# Patient Record
Sex: MALE | Race: WHITE | NOT HISPANIC OR LATINO | Employment: STUDENT | URBAN - METROPOLITAN AREA
[De-identification: names, ages, dates, MRNs, and addresses within clinical notes are randomized per-mention and may not be internally consistent; named-entity substitution may affect disease eponyms.]

---
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
## 2022-01-14 ENCOUNTER — OFFICE VISIT (OUTPATIENT)
Dept: FAMILY MEDICINE CLINIC | Facility: CLINIC | Age: 22
End: 2022-01-14
Payer: COMMERCIAL

## 2022-01-14 VITALS
DIASTOLIC BLOOD PRESSURE: 74 MMHG | RESPIRATION RATE: 12 BRPM | TEMPERATURE: 98.7 F | HEART RATE: 84 BPM | HEIGHT: 68 IN | OXYGEN SATURATION: 98 % | WEIGHT: 161 LBS | SYSTOLIC BLOOD PRESSURE: 128 MMHG | BODY MASS INDEX: 24.4 KG/M2

## 2022-01-14 DIAGNOSIS — E10.9 TYPE 1 DIABETES MELLITUS WITHOUT COMPLICATION (HCC): Primary | ICD-10-CM

## 2022-01-14 DIAGNOSIS — Z78.9 MOTIVATIONAL BARRIER TO EDUCATION: ICD-10-CM

## 2022-01-14 PROBLEM — R07.89 SENSATION OF CHEST PRESSURE: Status: RESOLVED | Noted: 2021-07-13 | Resolved: 2022-01-14

## 2022-01-14 PROCEDURE — 3074F SYST BP LT 130 MM HG: CPT | Performed by: NURSE PRACTITIONER

## 2022-01-14 PROCEDURE — 3078F DIAST BP <80 MM HG: CPT | Performed by: NURSE PRACTITIONER

## 2022-01-14 PROCEDURE — 3008F BODY MASS INDEX DOCD: CPT | Performed by: NURSE PRACTITIONER

## 2022-01-14 PROCEDURE — 1036F TOBACCO NON-USER: CPT | Performed by: NURSE PRACTITIONER

## 2022-01-14 PROCEDURE — 99214 OFFICE O/P EST MOD 30 MIN: CPT | Performed by: NURSE PRACTITIONER

## 2022-01-14 NOTE — ASSESSMENT & PLAN NOTE
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

## 2022-01-14 NOTE — PROGRESS NOTES
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  Denies any significant issues with concentration  Return in about 3 months (around 4/14/2022) for Diabetic Follow Up  Subjective:      Patient ID: Melinda Knox is a 25 y o  male  Chief Complaint   Patient presents with    Diabetes     Pt here for a DM f/u  Jennifer Nava is a 25year old male who presents to the office for a diabetic follow up and discussion of school issues  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  No longer doing art  Denies any new acute complaints today  Reports he is just learning the coding and will see how he does as far as concentration  Denies fever, chills, chest pain, SOB  The following portions of the patient's history were reviewed and updated as appropriate: allergies, current medications, past family history, past medical history, past social history, past surgical history and problem list     Review of Systems   Constitutional: Negative for chills, fatigue and fever  Respiratory: Positive for chest tightness  Negative for cough and shortness of breath  Cardiovascular: Negative for chest pain  Psychiatric/Behavioral: Negative for decreased concentration  Current Outpatient Medications   Medication Sig Dispense Refill    Ascorbic Acid (VITAMIN C PO) Take by mouth daily      BD PEN NEEDLE JULES U/F 32G X 4 MM MISC USE AS DIRECTED TO INJECT INSULIN 4-6 TIMES PER DAY    6    Continuous Blood Gluc  (Dexcom G6 ) ZEV Use      Continuous Blood Gluc Sensor (Dexcom G6 Sensor) MISC Use      Continuous Blood Gluc Transmit (Dexcom G6 Transmitter) MISC Use  GLUCAGON EMERGENCY 1 MG injection       HUMALOG KWIKPEN 100 units/mL injection pen       Lancet Devices (ONE TOUCH DELICA LANCING DEV) MISC USE FOR BLOOD SUGAR CHECKS UP TO 8 TIMES PER DAY  1    LANTUS SOLOSTAR 100 units/mL injection pen       ONETOUCH DELICA LANCETS 13B MISC       ONETOUCH VERIO test strip       Zinc Gluconate ([employer] ZINC PO) Take by mouth daily       No current facility-administered medications for this visit  Objective:    /74 (BP Location: Right arm, Patient Position: Sitting, Cuff Size: Adult)   Pulse 84   Temp 98 7 °F (37 1 °C) (Temporal)   Resp 12   Ht 5' 8" (1 727 m)   Wt 73 kg (161 lb)   SpO2 98%   BMI 24 48 kg/m²        Physical Exam  Constitutional:       General: He is not in acute distress  Appearance: He is well-developed  He is not diaphoretic  HENT:      Head: Normocephalic and atraumatic  Eyes:      General:         Right eye: No discharge  Left eye: No discharge  Conjunctiva/sclera: Conjunctivae normal    Neck:      Thyroid: No thyromegaly  Cardiovascular:      Rate and Rhythm: Normal rate and regular rhythm  Heart sounds: Normal heart sounds  Pulmonary:      Effort: Pulmonary effort is normal  No respiratory distress  Breath sounds: Normal breath sounds  No decreased breath sounds, wheezing, rhonchi or rales  Skin:     General: Skin is warm and dry  Findings: No rash  Neurological:      Mental Status: He is alert and oriented to person, place, and time  Psychiatric:         Behavior: Behavior normal          Thought Content:  Thought content normal          Judgment: Judgment normal                 Isatu Specking, CRNP

## 2022-01-15 LAB
ALBUMIN/CREAT UR: 4 MG/G CREAT (ref 0–29)
CREAT UR-MCNC: 239.3 MG/DL
MICROALBUMIN UR-MCNC: 10.7 UG/ML

## 2022-01-15 PROCEDURE — 3061F NEG MICROALBUMINURIA REV: CPT | Performed by: NURSE PRACTITIONER

## 2022-09-06 ENCOUNTER — VBI (OUTPATIENT)
Dept: ADMINISTRATIVE | Facility: OTHER | Age: 22
End: 2022-09-06

## 2022-11-01 ENCOUNTER — OFFICE VISIT (OUTPATIENT)
Dept: FAMILY MEDICINE CLINIC | Facility: CLINIC | Age: 22
End: 2022-11-01

## 2022-11-01 VITALS
OXYGEN SATURATION: 99 % | BODY MASS INDEX: 25.01 KG/M2 | WEIGHT: 165 LBS | HEART RATE: 74 BPM | HEIGHT: 68 IN | TEMPERATURE: 98.2 F | SYSTOLIC BLOOD PRESSURE: 112 MMHG | RESPIRATION RATE: 18 BRPM | DIASTOLIC BLOOD PRESSURE: 64 MMHG

## 2022-11-01 DIAGNOSIS — M94.0 COSTOCHONDRITIS: Primary | ICD-10-CM

## 2022-11-01 DIAGNOSIS — R00.2 PALPITATIONS: ICD-10-CM

## 2022-11-01 DIAGNOSIS — Z23 NEED FOR INFLUENZA VACCINATION: ICD-10-CM

## 2022-11-01 DIAGNOSIS — E10.9 TYPE 1 DIABETES MELLITUS WITHOUT COMPLICATION (HCC): ICD-10-CM

## 2022-11-01 DIAGNOSIS — R56.9 CONVULSIONS, UNSPECIFIED CONVULSION TYPE (HCC): ICD-10-CM

## 2022-11-01 DIAGNOSIS — R06.02 SHORTNESS OF BREATH: ICD-10-CM

## 2022-11-01 DIAGNOSIS — R07.9 CHEST PAIN, UNSPECIFIED TYPE: ICD-10-CM

## 2022-11-01 PROBLEM — U07.1 COVID-19: Status: RESOLVED | Noted: 2021-09-27 | Resolved: 2022-11-01

## 2022-11-01 PROBLEM — Z20.822 CLOSE EXPOSURE TO COVID-19 VIRUS: Status: RESOLVED | Noted: 2020-06-26 | Resolved: 2022-11-01

## 2022-11-01 RX ORDER — NAPROXEN 500 MG/1
500 TABLET ORAL 2 TIMES DAILY WITH MEALS
Qty: 10 TABLET | Refills: 1 | Status: SHIPPED | OUTPATIENT
Start: 2022-11-01 | End: 2022-11-06

## 2022-11-01 RX ORDER — URINE ACETONE TEST STRIPS
STRIP MISCELLANEOUS
COMMUNITY
Start: 2022-08-23

## 2022-11-01 NOTE — PROGRESS NOTES
Assessment/Plan:    1  Costochondritis  -     naproxen (Naprosyn) 500 mg tablet; Take 1 tablet (500 mg total) by mouth 2 (two) times a day with meals for 5 days    2  Chest pain, unspecified type  -     POCT ECG  -     CBC and differential  -     Comprehensive metabolic panel  -     Sedimentation rate, automated  -     C-reactive protein    3  Palpitations  -     POCT ECG  -     CBC and differential  -     Comprehensive metabolic panel  -     Sedimentation rate, automated  -     C-reactive protein    4  Shortness of breath  -     POCT ECG  -     CBC and differential  -     Comprehensive metabolic panel  -     Sedimentation rate, automated  -     C-reactive protein    5  Type 1 diabetes mellitus without complication (HCC)  -     Hemoglobin A1C    6  Convulsions, unspecified convulsion type [facility])  Assessment & Plan:  No issues  No seizure activity       7  Need for influenza vaccination  -     influenza vaccine, quadrivalent, 0 5 mL, preservative-free, for adult and pediatric patients 6 mos+ (AFLURIA, FLUARIX, FLULAVAL, FLUZONE)      Symptoms of palpitations and shortness of breath are worse while at work  Possible anxiety causing symptoms  Pt reports he is scheduled to see cardiology  Recheck in one week here in our office  Return in about 1 week (around 11/8/2022) for Recheck  Subjective:      Patient ID: Joann Bose is a 23 y o  male  Chief Complaint   Patient presents with   • Chest Pain   • Palpitations   • Shortness of Breath       Eb Rider is a 23year old male diabetic who presents to the office for evaluation and management of chest pain  Reports the pain began about 2-3 weeks ago  States the pain is random in occurrence and is on his left side of his chest and it extends into his left side  Reports palpitations and shortness of breath that seem to become worse when he is at work  Pt works at a [employer] and 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   States that he has also been lifting at the gym        The following portions of the patient's history were reviewed and updated as appropriate: allergies, current medications, past family history, past medical history, past social history, past surgical history and problem list     Review of Systems   Constitutional: Negative for chills, diaphoresis, fatigue and fever  HENT: Negative for congestion, ear discharge, ear pain, postnasal drip, rhinorrhea, sinus pressure, sinus pain and sore throat  Eyes: Negative for pain and discharge  Respiratory: Positive for shortness of breath  Negative for cough, chest tightness and wheezing  Cardiovascular: Positive for chest pain and palpitations  Negative for leg swelling  Gastrointestinal: Negative for diarrhea, nausea and vomiting  Musculoskeletal: Negative for myalgias  Neurological: Negative for dizziness and headaches  Hematological: Negative for adenopathy  Current Outpatient Medications   Medication Sig Dispense Refill   • Ascorbic Acid (VITAMIN C PO) Take by mouth daily     • BD PEN NEEDLE JULES U/F 32G X 4 MM MISC USE AS DIRECTED TO INJECT INSULIN 4-6 TIMES PER DAY  6   • Continuous Blood Gluc  (Dexcom G6 ) ZEV Use     • Continuous Blood Gluc Sensor (Dexcom G6 Sensor) MISC Use     • Continuous Blood Gluc Transmit (Dexcom G6 Transmitter) MISC Use     • GLUCAGON EMERGENCY 1 MG injection      • HUMALOG KWIKPEN 100 units/mL injection pen      • Ketostix strip USE AS DIRECTED UP TO 3 TIMES PER DAY IF BLOOD GLUCOSE >300 OR SICK     • Lancet Devices (ONE TOUCH DELICA LANCING DEV) MISC USE FOR BLOOD SUGAR CHECKS UP TO 8 TIMES PER DAY    1   • LANTUS SOLOSTAR 100 units/mL injection pen      • naproxen (Naprosyn) 500 mg tablet Take 1 tablet (500 mg total) by mouth 2 (two) times a day with meals for 5 days 10 tablet 1   • ONETOUCH DELICA LANCETS 85C MISC      • ONETOUCH VERIO test strip      • Zinc Gluconate (CVS ZINC PO) Take by mouth daily       No current facility-administered medications for this visit  Objective:    /64   Pulse 74   Temp 98 2 °F (36 8 °C) (Temporal)   Resp 18   Ht 5' 8" (1 727 m)   Wt 74 8 kg (165 lb)   SpO2 99%   BMI 25 09 kg/m²        Physical Exam  Vitals reviewed  Constitutional:       General: He is not in acute distress  Appearance: He is well-developed  He is not diaphoretic  HENT:      Head: Normocephalic and atraumatic  Eyes:      General:         Right eye: No discharge  Left eye: No discharge  Conjunctiva/sclera: Conjunctivae normal    Neck:      Thyroid: No thyromegaly  Cardiovascular:      Rate and Rhythm: Normal rate  Rhythm irregular  Pulses: Normal pulses  no weak pulses          Dorsalis pedis pulses are 2+ on the right side and 2+ on the left side  Posterior tibial pulses are 2+ on the right side and 2+ on the left side  Heart sounds: Normal heart sounds  Pulmonary:      Effort: Pulmonary effort is normal  No respiratory distress  Breath sounds: Normal breath sounds  No decreased breath sounds, wheezing, rhonchi or rales  Chest:      Chest wall: Tenderness present  No mass  Musculoskeletal:      Cervical back: Normal range of motion and neck supple  Feet:      Right foot:      Skin integrity: No ulcer, skin breakdown, erythema, warmth, callus or dry skin  Left foot:      Skin integrity: No ulcer, skin breakdown, erythema, warmth, callus or dry skin  Lymphadenopathy:      Cervical: No cervical adenopathy  Skin:     General: Skin is warm and dry  Findings: No rash  Neurological:      Mental Status: He is alert and oriented to person, place, and time  Psychiatric:         Behavior: Behavior normal          Thought Content: Thought content normal          Judgment: Judgment normal            Patient's shoes and socks removed  Right Foot/Ankle   Right Foot Inspection  Skin Exam: skin normal and skin intact   No dry skin, no warmth, no callus, no erythema, no maceration, no abnormal color, no pre-ulcer, no ulcer and no callus  Toe Exam: ROM and strength within normal limits  Sensory   Vibration: intact  Proprioception: intact  Monofilament testing: intact    Vascular  Capillary refills: < 3 seconds  The right DP pulse is 2+  The right PT pulse is 2+  Left Foot/Ankle  Left Foot Inspection  Skin Exam: skin normal and skin intact  No dry skin, no warmth, no erythema, no maceration, normal color, no pre-ulcer, no ulcer and no callus  Toe Exam: ROM and strength within normal limits  Sensory   Vibration: intact  Proprioception: intact  Monofilament testing: intact    Vascular  Capillary refills: < 3 seconds  The left DP pulse is 2+  The left PT pulse is 2+       Assign Risk Category  No deformity present  No loss of protective sensation  No weak pulses  Risk: 0           Rosetta Rodriguez

## 2022-11-01 NOTE — LETTER
November 1, 2022     Patient: Jax Vizcarra  YOB: 2000  Date of Visit: 11/1/2022      To Whom it May Concern:    Jax Vizcarra is under my professional care  Rogelio Cuevas was seen in my office on 11/1/2022  Rogelio Cuevas may return to work on 11/7/52  If you have any questions or concerns, please don't hesitate to call           SincerelyGayle

## 2022-11-02 LAB
ALBUMIN SERPL-MCNC: 4.8 G/DL (ref 4.1–5.2)
ALBUMIN/GLOB SERPL: 2.4 {RATIO} (ref 1.2–2.2)
ALP SERPL-CCNC: 83 IU/L (ref 51–125)
ALT SERPL-CCNC: 14 IU/L (ref 0–44)
AST SERPL-CCNC: 20 IU/L (ref 0–40)
BASOPHILS # BLD AUTO: 0 X10E3/UL (ref 0–0.2)
BASOPHILS NFR BLD AUTO: 0 %
BILIRUB SERPL-MCNC: 0.3 MG/DL (ref 0–1.2)
BUN SERPL-MCNC: 10 MG/DL (ref 6–20)
BUN/CREAT SERPL: 16 (ref 9–20)
CALCIUM SERPL-MCNC: 9.4 MG/DL (ref 8.7–10.2)
CHLORIDE SERPL-SCNC: 101 MMOL/L (ref 96–106)
CO2 SERPL-SCNC: 24 MMOL/L (ref 20–29)
CREAT SERPL-MCNC: 0.62 MG/DL (ref 0.76–1.27)
CRP SERPL-MCNC: <1 MG/L (ref 0–10)
EGFR: 140 ML/MIN/1.73
EOSINOPHIL # BLD AUTO: 0.1 X10E3/UL (ref 0–0.4)
EOSINOPHIL NFR BLD AUTO: 1 %
ERYTHROCYTE [DISTWIDTH] IN BLOOD BY AUTOMATED COUNT: 11.9 % (ref 11.6–15.4)
ERYTHROCYTE [SEDIMENTATION RATE] IN BLOOD BY WESTERGREN METHOD: 4 MM/HR (ref 0–15)
EST. AVERAGE GLUCOSE BLD GHB EST-MCNC: 128 MG/DL
GLOBULIN SER-MCNC: 2 G/DL (ref 1.5–4.5)
GLUCOSE SERPL-MCNC: 96 MG/DL (ref 70–99)
HBA1C MFR BLD: 6.1 % (ref 4.8–5.6)
HCT VFR BLD AUTO: 42.4 % (ref 37.5–51)
HGB BLD-MCNC: 14.8 G/DL (ref 13–17.7)
IMM GRANULOCYTES # BLD: 0 X10E3/UL (ref 0–0.1)
IMM GRANULOCYTES NFR BLD: 0 %
LYMPHOCYTES # BLD AUTO: 1.8 X10E3/UL (ref 0.7–3.1)
LYMPHOCYTES NFR BLD AUTO: 22 %
MCH RBC QN AUTO: 31.7 PG (ref 26.6–33)
MCHC RBC AUTO-ENTMCNC: 34.9 G/DL (ref 31.5–35.7)
MCV RBC AUTO: 91 FL (ref 79–97)
MONOCYTES # BLD AUTO: 0.7 X10E3/UL (ref 0.1–0.9)
MONOCYTES NFR BLD AUTO: 8 %
NEUTROPHILS # BLD AUTO: 5.6 X10E3/UL (ref 1.4–7)
NEUTROPHILS NFR BLD AUTO: 69 %
PLATELET # BLD AUTO: 306 X10E3/UL (ref 150–450)
POTASSIUM SERPL-SCNC: 4.4 MMOL/L (ref 3.5–5.2)
PROT SERPL-MCNC: 6.8 G/DL (ref 6–8.5)
RBC # BLD AUTO: 4.67 X10E6/UL (ref 4.14–5.8)
SODIUM SERPL-SCNC: 140 MMOL/L (ref 134–144)
WBC # BLD AUTO: 8.3 X10E3/UL (ref 3.4–10.8)

## 2022-11-17 ENCOUNTER — OFFICE VISIT (OUTPATIENT)
Dept: CARDIOLOGY CLINIC | Facility: CLINIC | Age: 22
End: 2022-11-17

## 2022-11-17 VITALS
BODY MASS INDEX: 26.07 KG/M2 | SYSTOLIC BLOOD PRESSURE: 126 MMHG | HEART RATE: 71 BPM | TEMPERATURE: 97.8 F | DIASTOLIC BLOOD PRESSURE: 78 MMHG | WEIGHT: 172 LBS | HEIGHT: 68 IN | OXYGEN SATURATION: 99 %

## 2022-11-17 DIAGNOSIS — R07.2 PRECORDIAL PAIN: ICD-10-CM

## 2022-11-17 DIAGNOSIS — R06.02 SHORTNESS OF BREATH ON EXERTION: ICD-10-CM

## 2022-11-17 DIAGNOSIS — R00.2 PALPITATIONS: Primary | ICD-10-CM

## 2022-11-17 NOTE — PROGRESS NOTES
Consultation - Cardiology Office  [facility]  Melody Fletcher 23 y o  male MRN: [id number]  : 2000  Unit/Bed#:  Encounter: [id number]      ASSESSMENT:  Palpitations/Irregular heartbeats    Diabetes mellitus type 1    Chest pain and dyspnea on exertion    Costochondritis, on Naprosyn      RECOMMENDATIONS:  48 hour Holter monitor  Echocardiogram  Exercise stress test        Thank you for your consultation  If you have any question please call me at [phone]      Primary Care Physician Requesting Consult: Pierce Lema      Reason for Consult / Principal Problem:  Palpitations, shortness of breath chest pain        HPI :     Melody Fletcher is a 23y o  year old male who was referred by primary care doctor for cardiac evaluation of multiple symptoms including  Palpitations that the patient has been feeling once every few hours on a daily basis  He also complains of shortness of breath/dyspnea on exertion  He was diagnosed as costochondritis but is still concerned about left-sided chest pain that he has been getting in spite of being treated with NSAIDs  Review of Systems   Respiratory: Positive for shortness of breath  Cardiovascular: Positive for chest pain and palpitations  All other systems reviewed and are negative        Historical Information   Past Medical History:   Diagnosis Date   • Diabetes mellitus ([address] )     type 1     Past Surgical History:   Procedure Laterality Date   • CIRCUMCISION     • DENTAL SURGERY       Social History     Substance and Sexual Activity   Alcohol Use No     Social History     Substance and Sexual Activity   Drug Use No     Social History     Tobacco Use   Smoking Status Never   Smokeless Tobacco Never     Family History:   Family History   Problem Relation Age of Onset   • No Known Problems Mother    • No Known Problems Father    • Mental illness Neg Hx    • Substance Abuse Neg Hx        Meds/Allergies     Allergies   Allergen Reactions   • Amoxicillin Other (See Comments)     unknown   • Augmentin [Amoxicillin-Pot Clavulanate] Other (See Comments)     unknown   • Penicillins Other (See Comments)     unknown       Current Outpatient Medications:   •  Ascorbic Acid (VITAMIN C PO), Take by mouth daily, Disp: , Rfl:   •  BD PEN NEEDLE JULES U/F 32G X 4 MM MISC, USE AS DIRECTED TO INJECT INSULIN 4-6 TIMES PER DAY , Disp: , Rfl: 6  •  Continuous Blood Gluc  (Dexcom G6 ) ZEV, Use, Disp: , Rfl:   •  Continuous Blood Gluc Sensor (Dexcom G6 Sensor) MISC, Use, Disp: , Rfl:   •  Continuous Blood Gluc Transmit (Dexcom G6 Transmitter) MISC, Use, Disp: , Rfl:   •  GLUCAGON EMERGENCY 1 MG injection, , Disp: , Rfl:   •  HUMALOG KWIKPEN 100 units/mL injection pen, , Disp: , Rfl:   •  Ketostix strip, USE AS DIRECTED UP TO 3 TIMES PER DAY IF BLOOD GLUCOSE >300 OR SICK, Disp: , Rfl:   •  Lancet Devices (ONE TOUCH DELICA LANCING DEV) MISC, USE FOR BLOOD SUGAR CHECKS UP TO 8 TIMES PER DAY , Disp: , Rfl: 1  •  LANTUS SOLOSTAR 100 units/mL injection pen, , Disp: , Rfl:   •  ONETOUCH DELICA LANCETS 04S MISC, , Disp: , Rfl:   •  ONETOUCH VERIO test strip, , Disp: , Rfl:   •  Zinc Gluconate ([employer]S ZINC PO), Take by mouth daily, Disp: , Rfl:   •  naproxen (Naprosyn) 500 mg tablet, Take 1 tablet (500 mg total) by mouth 2 (two) times a day with meals for 5 days, Disp: 10 tablet, Rfl: 1    Vitals: Blood pressure 126/78, pulse 71, temperature 97 8 °F (36 6 °C), height 5' 8" (1 727 m), weight 78 kg (172 lb), SpO2 99 %  ?  Body mass index is 26 15 kg/m²    Vitals:    11/17/22 1038   Weight: 78 kg (172 lb)     BP Readings from Last 3 Encounters:   11/17/22 126/78   11/01/22 112/64   01/14/22 128/74       PHYSICAL EXAMINATION:  Neurologic:  Alert & oriented x 3, no new focal deficits, Not in any acute distress,  Constitutional:  Well developed, well nourished, non-toxic appearance   Eyes:  Pupil equal and reacting to light, conjunctiva normal, No JVP, No LNP HENT:  Atraumatic, oropharynx moist, Neck- normal range of motion, no tenderness,  Neck supple   Respiratory:  Bilateral air entry, mostly clear to auscultation  Cardiovascular: S1-S2 regular rhythm  GI:  Soft, nondistended, normal bowel sounds, nontender, no hepatosplenomegaly appreciated  Musculoskeletal: no tenderness, no deformities  Skin:  Well hydrated, no rash   Lymphatic:  No lymphadenopathy noted   Extremities:  No edema and distal pulses are present    Diagnostic Studies Review Cardio:      EKG:  Normal sinus rhythm, heart rate 71 / minute      Imaging:  Chest X-Ray:   No Chest XR results available for this patient  CT-scan of the chest:     No CTA results available for this patient    Lab Review   Lab Results   Component Value Date    WBC 8 3 11/01/2022    HGB 14 8 11/01/2022    HCT 42 4 11/01/2022    MCV 91 11/01/2022    RDW 11 9 11/01/2022     11/01/2022     BMP:  Lab Results   Component Value Date    SODIUM 140 11/01/2022    K 4 4 11/01/2022     11/01/2022    CO2 24 11/01/2022    BUN 10 11/01/2022    CREATININE 0 62 (L) 11/01/2022    GLUC 96 11/01/2022    EGFR 140 11/01/2022     LFT:  Lab Results   Component Value Date    AST 20 11/01/2022    ALT 14 11/01/2022    TP 6 8 11/01/2022    ALB 4 8 11/01/2022      No results found for: [id number]  No components found for: [facility]  Lab Results   Component Value Date    HGBA1C 6 1 (H) 11/01/2022     Lipid Profile:   Lab Results   Component Value Date    CHOLESTEROL 124 03/05/2021    HDL 44 03/05/2021    LDLCALC 71 03/05/2021    TRIG 33 03/05/2021     Lab Results   Component Value Date    CHOLESTEROL 124 03/05/2021     No results found for: CKTOTAL, CKMB, CKMBINDEX, TROPONINI  No results found for: NTBNP   Recent Results (from the past 672 hour(s))   CBC and differential    Collection Time: 11/01/22  3:30 PM   Result Value Ref Range    White Blood Cell Count 8 3 3 4 - 10 8 x10E3/uL    Red Blood Cell Count 4 67 4 14 - 5 80 x10E6/uL Hemoglobin 14 8 13 0 - 17 7 g/dL    HCT 42 4 37 5 - 51 0 %    MCV 91 79 - 97 fL    MCH 31 7 26 6 - 33 0 pg    MCHC 34 9 31 5 - 35 7 g/dL    RDW 11 9 11 6 - 15 4 %    Platelet Count 526 023 - 450 x10E3/uL    Neutrophils 69 Not Estab  %    Lymphocytes 22 Not Estab  %    Monocytes 8 Not Estab  %    Eosinophils 1 Not Estab  %    Basophils PCT 0 Not Estab  %    Neutrophils (Absolute) 5 6 1 4 - 7 0 x10E3/uL    Lymphocytes (Absolute) 1 8 0 7 - 3 1 x10E3/uL    Monocytes (Absolute) 0 7 0 1 - 0 9 x10E3/uL    Eosinophils (Absolute) 0 1 0 0 - 0 4 x10E3/uL    Basophils ABS 0 0 0 0 - 0 2 x10E3/uL    Immature Granulocytes 0 Not Estab  %    Immature Granulocytes (Absolute) 0 0 0 0 - 0 1 x10E3/uL   Comprehensive metabolic panel    Collection Time: 11/01/22  3:30 PM   Result Value Ref Range    Glucose, Random 96 70 - 99 mg/dL    BUN 10 6 - 20 mg/dL    Creatinine 0 62 (L) 0 76 - 1 27 mg/dL    eGFR 140 >59 mL/min/1 73    SL AMB BUN/CREATININE RATIO 16 9 - 20    Sodium 140 134 - 144 mmol/L    Potassium 4 4 3 5 - 5 2 mmol/L    Chloride 101 96 - 106 mmol/L    CO2 24 20 - 29 mmol/L    CALCIUM 9 4 8 7 - 10 2 mg/dL    Protein, Total 6 8 6 0 - 8 5 g/dL    Albumin 4 8 4 1 - 5 2 g/dL    Globulin, Total 2 0 1 5 - 4 5 g/dL    Albumin/Globulin Ratio 2 4 (H) 1 2 - 2 2    TOTAL BILIRUBIN 0 3 0 0 - 1 2 mg/dL    Alk Phos Isoenzymes 83 51 - 125 IU/L    AST 20 0 - 40 IU/L    ALT 14 0 - 44 IU/L   Sedimentation rate, automated    Collection Time: 11/01/22  3:30 PM   Result Value Ref Range    Sedimentation Rate 4 0 - 15 mm/hr   C-reactive protein    Collection Time: 11/01/22  3:30 PM   Result Value Ref Range    C-Reactive Protein, Quant <1 0 - 10 mg/L   Hemoglobin A1C    Collection Time: 11/01/22  3:30 PM   Result Value Ref Range    Hemoglobin A1C 6 1 (H) 4 8 - 5 6 %    Estimated Average Glucose 128 mg/dL           Dr Miquel White MD, [facility] [place]      "This note has been constructed using a voice recognition system  Therefore there may be syntax, spelling, and/or grammatical errors   Please call if you have any questions  "

## 2022-12-20 ENCOUNTER — HOSPITAL ENCOUNTER (OUTPATIENT)
Dept: NON INVASIVE DIAGNOSTICS | Facility: HOSPITAL | Age: 22
Discharge: HOME | End: 2022-12-20
Attending: INTERNAL MEDICINE

## 2023-12-17 ENCOUNTER — OFFICE VISIT (OUTPATIENT)
Dept: URGENT CARE | Facility: CLINIC | Age: 23
End: 2023-12-17
Payer: COMMERCIAL

## 2023-12-17 VITALS
WEIGHT: 178 LBS | OXYGEN SATURATION: 98 % | RESPIRATION RATE: 16 BRPM | TEMPERATURE: 96 F | HEART RATE: 77 BPM | BODY MASS INDEX: 27.06 KG/M2

## 2023-12-17 DIAGNOSIS — H00.014 HORDEOLUM EXTERNUM OF LEFT UPPER EYELID: Primary | ICD-10-CM

## 2023-12-17 PROCEDURE — 99213 OFFICE O/P EST LOW 20 MIN: CPT | Performed by: PHYSICIAN ASSISTANT

## 2023-12-17 RX ORDER — OFLOXACIN 3 MG/ML
1 SOLUTION/ DROPS OPHTHALMIC 4 TIMES DAILY
Qty: 5 ML | Refills: 0 | Status: SHIPPED | OUTPATIENT
Start: 2023-12-17 | End: 2023-12-19

## 2023-12-17 NOTE — PROGRESS NOTES
[facility]        NAME: Panda uDong is a 23 y.o. male  : 2000    MRN: [id number]  DATE: 2023  TIME: 1:55 PM    Assessment and Plan   Hordeolum externum of left upper eyelid [H00.014]  1. Hordeolum externum of left upper eyelid  ofloxacin (OCUFLOX) 0.3 % ophthalmic solution            Patient Instructions     Stye L upper eyelid:   -The patient's hx is consistent with possible stye.  Will prescribe ofloxacin  to be used as directed.   -warm compresses on the eyes 2-3 times a day   -Eyelid scrub before bed as discussed   -Saline eye drops to flush discharge   -Avoid contact lenses until your symptoms improve  -Sunglasses can be worn for light sensitivity  -Frequent hand washing   -Follow up with your Eye Doctor immediately if your sx worsen or persist           Follow up with PCP in 3-5 days.  Proceed to  ER if symptoms worsen.    Chief Complaint     Chief Complaint   Patient presents with    Eye Problem     Swollen lt eye lid with pain began this am         History of Present Illness       The patient is a 23-year-old male who presents today for L eyelid swelling and tenderness x 8 hours. He states that he woke up with mild redness, swelling and tenderness over the L upper eyelid. He states that he felt mild pain and tenderness yesterday. No visual changes, photophobia. No drainage. No contact lens use. No trauma. No OTC measures. No recent URI sx.         Review of Systems   Review of Systems   Constitutional:  Negative for activity change, appetite change, chills, diaphoresis, fatigue and fever.   HENT:  Negative for congestion, ear discharge, ear pain, facial swelling, hearing loss, postnasal drip, rhinorrhea, sinus pressure, sinus pain, sore throat, tinnitus, trouble swallowing and voice change.    Eyes:  Positive for pain and redness. Negative for photophobia, discharge, itching and visual disturbance.   Respiratory:  Negative for apnea, cough, chest tightness, shortness of breath,  wheezing and stridor.    Cardiovascular:  Negative for chest pain, palpitations and leg swelling.   Gastrointestinal:  Negative for abdominal distention and abdominal pain.   Genitourinary:  Negative for decreased urine volume.   Musculoskeletal:  Negative for arthralgias, joint swelling, myalgias, neck pain and neck stiffness.   Skin:  Negative for rash.   Allergic/Immunologic: Negative for immunocompromised state.   Neurological:  Negative for dizziness, weakness, light-headedness, numbness and headaches.   Hematological:  Negative for adenopathy.         Current Medications       Current Outpatient Medications:     BD PEN NEEDLE JULES U/F 32G X 4 MM MISC, USE AS DIRECTED TO INJECT INSULIN 4-6 TIMES PER DAY., Disp: , Rfl: 6    HUMALOG KWIKPEN 100 units/mL injection pen, , Disp: , Rfl:     Ketostix strip, USE AS DIRECTED UP TO 3 TIMES PER DAY IF BLOOD GLUCOSE >300 OR SICK, Disp: , Rfl:     Lancet Devices (ONE TOUCH DELICA LANCING DEV) MISC, USE FOR BLOOD SUGAR CHECKS UP TO 8 TIMES PER DAY., Disp: , Rfl: 1    LANTUS SOLOSTAR 100 units/mL injection pen, , Disp: , Rfl:     ofloxacin (OCUFLOX) 0.3 % ophthalmic solution, Administer 1 drop into the left eye 4 (four) times a day for 7 days, Disp: 5 mL, Rfl: 0    ONETOUCH DELICA LANCETS 33G MISC, , Disp: , Rfl:     ONETOUCH VERIO test strip, , Disp: , Rfl:     Zinc Gluconate (CVS ZINC PO), Take by mouth daily, Disp: , Rfl:     Ascorbic Acid (VITAMIN C PO), Take by mouth daily (Patient not taking: Reported on 12/17/2023), Disp: , Rfl:     Continuous Blood Gluc  (Dexcom G6 ) ZEV, Use (Patient not taking: Reported on 12/17/2023), Disp: , Rfl:     Continuous Blood Gluc Sensor (Dexcom G6 Sensor) MISC, Use (Patient not taking: Reported on 12/17/2023), Disp: , Rfl:     Continuous Blood Gluc Transmit (Dexcom G6 Transmitter) MISC, Use (Patient not taking: Reported on 12/17/2023), Disp: , Rfl:     GLUCAGON EMERGENCY 1 MG injection, , Disp: , Rfl:     naproxen  (Naprosyn) 500 mg tablet, Take 1 tablet (500 mg total) by mouth 2 (two) times a day with meals for 5 days, Disp: 10 tablet, Rfl: 1    Current Allergies     Allergies as of 12/17/2023 - Reviewed 12/17/2023   Allergen Reaction Noted    Amoxicillin Other (See Comments) 03/20/2015    Augmentin [amoxicillin-pot clavulanate] Other (See Comments) 03/20/2015    Penicillins Other (See Comments) 09/11/2017            The following portions of the patient's history were reviewed and updated as appropriate: allergies, current medications, past family history, past medical history, past social history, past surgical history and problem list.     Past Medical History:   Diagnosis Date    Diabetes mellitus (HCC)     type 1       Past Surgical History:   Procedure Laterality Date    CIRCUMCISION      DENTAL SURGERY         Family History   Problem Relation Age of Onset    No Known Problems Mother     No Known Problems Father     Mental illness Neg Hx     Substance Abuse Neg Hx          Medications have been verified.        Objective   Pulse 77   Temp (!) 96 °F (35.6 °C)   Resp 16   Wt 80.7 kg (178 lb)   SpO2 98%   BMI 27.06 kg/m²   No LMP for male patient.       Physical Exam     Physical Exam  Vitals and nursing note reviewed.   Constitutional:       General: He is not in acute distress.     Appearance: He is well-developed. He is not ill-appearing or diaphoretic.   HENT:      Head: Normocephalic and atraumatic.      Right Ear: Hearing, tympanic membrane, ear canal and external ear normal.      Left Ear: Hearing, tympanic membrane, ear canal and external ear normal.      Nose: Nose normal. No mucosal edema or rhinorrhea.      Right Sinus: No maxillary sinus tenderness or frontal sinus tenderness.      Left Sinus: No maxillary sinus tenderness or frontal sinus tenderness.      Mouth/Throat:      Pharynx: Uvula midline. No oropharyngeal exudate, posterior oropharyngeal erythema or uvula swelling.      Tonsils: No tonsillar  abscesses.   Eyes:      General: No allergic shiner or visual field deficit.        Left eye: Discharge and hordeolum (stye under the L upper eyelid with mild drainage and crusting) present.No foreign body.      Extraocular Movements: Extraocular movements intact.      Left eye: Normal extraocular motion and no nystagmus.      Conjunctiva/sclera:      Left eye: Left conjunctiva is not injected. No chemosis, exudate or hemorrhage.  Cardiovascular:      Rate and Rhythm: Normal rate and regular rhythm.      Heart sounds: S1 normal and S2 normal. Heart sounds not distant. No murmur heard.     No friction rub. No gallop. No S3 or S4 sounds.   Pulmonary:      Effort: No tachypnea, bradypnea, accessory muscle usage or respiratory distress.      Breath sounds: No decreased breath sounds, wheezing, rhonchi or rales.   Musculoskeletal:      Cervical back: Normal range of motion and neck supple.   Lymphadenopathy:      Cervical: No cervical adenopathy.   Neurological:      Mental Status: He is alert and oriented to person, place, and time.   Psychiatric:         Behavior: Behavior normal.

## 2023-12-17 NOTE — LETTER
December 17, 2023     Patient: Panda Duong   YOB: 2000   Date of Visit: 12/17/2023       To Whom It May Concern:    It is my medical opinion that Panda Duong needs to be excused from work today.    If you have any questions or concerns, please don't hesitate to call.         Sincerely,        Jazmin Lopez PA-C    CC: No Recipients

## 2023-12-17 NOTE — PATIENT INSTRUCTIONS
Stye   WHAT YOU NEED TO KNOW:   A stye is a lump on the edge or inside of your upper or lower eyelid caused by an infection. A stye usually starts to get better within 1 week and is often gone within 2 weeks.       DISCHARGE INSTRUCTIONS:   Call your doctor if:   You have redness and discharge around your eye, and your eye pain is getting worse.    Your vision changes.    The stye has not gone away within 2 weeks.    The stye comes back within a short period of time after treatment.    You have questions or concerns about your condition or care.    Medicines:   Antibiotic medicine  is given as an ointment to put into your eye. It is used to fight an infection caused by bacteria. Use as directed.    Take your medicine as directed.  Contact your healthcare provider if you think your medicine is not helping or if you have side effects. Tell your provider if you are allergic to any medicine. Keep a list of the medicines, vitamins, and herbs you take. Include the amounts, and when and why you take them. Bring the list or the pill bottles to follow-up visits. Carry your medicine list with you in case of an emergency.    Manage a stye:   Use warm compresses, as directed.  This will help decrease swelling and pain. Wet a clean washcloth with warm water and place it on your eye for 10 to 15 minutes, 3 to 4 times each day, or as directed.    Keep your hands away from your eye.  This helps to prevent the spread of infection to other parts of the eye. Wash your hands often with soap and dry with a clean towel. Do not squeeze the stye.    Do not wear eye makeup while you have a stye.  Eye makeup may carry bacteria and cause another stye. Throw away eye makeup and brushes used to apply the makeup. Use new eye makeup after the stye has gone away. Do not share eye makeup with others.    Prevent another stye:  Wash your face and clean your eyelashes every day. Remove eye makeup with makeup remover. This helps to remove eye makeup  without heavy rubbing.  Follow up with your doctor as directed:  Write down your questions so you remember to ask them during your visits.  © Copyright [employer] 2023 Information is for End User's use only and may not be sold, redistributed or otherwise used for commercial purposes.  The above information is an  only. It is not intended as medical advice for individual conditions or treatments. Talk to your doctor, nurse or pharmacist before following any medical regimen to see if it is safe and effective for you.      Stye L upper eyelid:   -The patient's hx is consistent with possible stye.  Will prescribe ofloxacin  to be used as directed.   -warm compresses on the eyes 2-3 times a day   -Eyelid scrub before bed as discussed   -Saline eye drops to flush discharge   -Avoid contact lenses until your symptoms improve  -Sunglasses can be worn for light sensitivity  -Frequent hand washing   -Follow up with your Eye Doctor immediately if your sx worsen or persist

## 2023-12-19 ENCOUNTER — OFFICE VISIT (OUTPATIENT)
Dept: FAMILY MEDICINE CLINIC | Facility: CLINIC | Age: 23
End: 2023-12-19
Payer: COMMERCIAL

## 2023-12-19 VITALS
DIASTOLIC BLOOD PRESSURE: 78 MMHG | OXYGEN SATURATION: 98 % | SYSTOLIC BLOOD PRESSURE: 108 MMHG | WEIGHT: 178 LBS | RESPIRATION RATE: 12 BRPM | TEMPERATURE: 98.2 F | HEIGHT: 68 IN | BODY MASS INDEX: 26.98 KG/M2 | HEART RATE: 88 BPM

## 2023-12-19 DIAGNOSIS — E10.9 TYPE 1 DIABETES MELLITUS WITHOUT COMPLICATIONS (HCC): ICD-10-CM

## 2023-12-19 DIAGNOSIS — R56.9 CONVULSIONS, UNSPECIFIED CONVULSION TYPE (HCC): ICD-10-CM

## 2023-12-19 DIAGNOSIS — H00.14 CHALAZION LEFT UPPER EYELID: Primary | ICD-10-CM

## 2023-12-19 LAB — SL AMB POCT HEMOGLOBIN AIC: 6 (ref ?–6.5)

## 2023-12-19 PROCEDURE — 83036 HEMOGLOBIN GLYCOSYLATED A1C: CPT | Performed by: NURSE PRACTITIONER

## 2023-12-19 PROCEDURE — 99213 OFFICE O/P EST LOW 20 MIN: CPT | Performed by: NURSE PRACTITIONER

## 2023-12-19 RX ORDER — TOBRAMYCIN AND DEXAMETHASONE 3; 1 MG/ML; MG/ML
1 SUSPENSION/ DROPS OPHTHALMIC
Qty: 5 ML | Refills: 0 | Status: SHIPPED | OUTPATIENT
Start: 2023-12-19

## 2023-12-19 RX ORDER — AZITHROMYCIN 250 MG/1
TABLET, FILM COATED ORAL
Qty: 6 TABLET | Refills: 0 | Status: SHIPPED | OUTPATIENT
Start: 2023-12-19 | End: 2023-12-23

## 2023-12-19 NOTE — ASSESSMENT & PLAN NOTE
Doing well overall.  Stable, recheck 3 months.     Lab Results   Component Value Date    HGBA1C 6.1 (H) 11/01/2022

## 2024-08-14 ENCOUNTER — OFFICE VISIT (OUTPATIENT)
Dept: URGENT CARE | Facility: CLINIC | Age: 24
End: 2024-08-14
Payer: COMMERCIAL

## 2024-08-14 VITALS
DIASTOLIC BLOOD PRESSURE: 86 MMHG | RESPIRATION RATE: 12 BRPM | TEMPERATURE: 97.5 F | SYSTOLIC BLOOD PRESSURE: 124 MMHG | HEART RATE: 88 BPM | BODY MASS INDEX: 26.3 KG/M2 | WEIGHT: 173 LBS | OXYGEN SATURATION: 99 %

## 2024-08-14 DIAGNOSIS — F33.0 MILD EPISODE OF RECURRENT MAJOR DEPRESSIVE DISORDER (HCC): Primary | ICD-10-CM

## 2024-08-14 PROCEDURE — 99213 OFFICE O/P EST LOW 20 MIN: CPT

## 2024-08-14 NOTE — PROGRESS NOTES
[facility]        NAME: Panda Duong is a 24 y.o. male  : 2000    MRN: [id number]  DATE: 2024  TIME: 7:42 PM    Assessment and Plan   Mild episode of recurrent major depressive disorder (HCC) [F33.0]  1. Mild episode of recurrent major depressive disorder (HCC)          Discussed reaching out to PCP regarding restarting medications for depression. Information on walk-in center provided. Advised to report to the ER sooner if symptoms worsen. Patient verbalizes understanding and agreeable to plan. Work note provided.    Patient Instructions     Check in with local walk-in center within next 24-48 hours. Follow-up with PCP regarding medication refills. Report to the ER sooner if symptoms worsen.     Chief Complaint     Chief Complaint   Patient presents with    Depression     Pt presents with depression that comes and goes/ started two months ago, but was not able to go to work recently r/t symptoms worsening// needs a work note         History of Present Illness       24 year old male presents for evaluation regarding depression. He reports h/o of ongoing depression for years but relates symptoms worsened over the past few months secondary to work-related stressors. He was previously prescribed anti-depressants when he was 18 but has not been on them recently. He denies SI, HI, auditory or visual hallucinations. He relates he's been having issues sleeping and missed work the past 2 days due to his symptoms. He is requesting a note to return to work.     Depression  This is a recurrent problem. The current episode started more than 1 year ago. The problem occurs 2 to 4 times per day. The problem has been unchanged. Pertinent negatives include no abdominal pain, anorexia, arthralgias, change in bowel habit, chest pain, chills, congestion, coughing, diaphoresis, fatigue, fever, headaches, joint swelling, myalgias, nausea, neck pain, numbness, rash, sore throat, swollen glands, urinary  symptoms, vertigo, visual change, vomiting or weakness. Nothing aggravates the symptoms. He has tried nothing for the symptoms. The treatment provided no relief.       Review of Systems   Review of Systems   Constitutional:  Positive for activity change. Negative for appetite change, chills, diaphoresis, fatigue and fever.   HENT:  Negative for congestion and sore throat.    Eyes:  Negative for visual disturbance.   Respiratory:  Negative for cough, chest tightness and shortness of breath.    Cardiovascular:  Negative for chest pain and palpitations.   Gastrointestinal:  Negative for abdominal pain, anorexia, change in bowel habit, constipation, diarrhea, nausea and vomiting.   Musculoskeletal:  Negative for arthralgias, joint swelling, myalgias and neck pain.   Skin:  Negative for color change, pallor and rash.   Allergic/Immunologic: Negative for environmental allergies and food allergies.   Neurological:  Negative for dizziness, vertigo, weakness, light-headedness, numbness and headaches.   Psychiatric/Behavioral:  Positive for depression and sleep disturbance. Negative for agitation, behavioral problems, confusion, decreased concentration, dysphoric mood, hallucinations, self-injury and suicidal ideas. The patient is not nervous/anxious and is not hyperactive.          Current Medications       Current Outpatient Medications:     BD PEN NEEDLE JULES U/F 32G X 4 MM MISC, USE AS DIRECTED TO INJECT INSULIN 4-6 TIMES PER DAY., Disp: , Rfl: 6    GLUCAGON EMERGENCY 1 MG injection, , Disp: , Rfl:     HUMALOG KWIKPEN 100 units/mL injection pen, , Disp: , Rfl:     Ketostix strip, USE AS DIRECTED UP TO 3 TIMES PER DAY IF BLOOD GLUCOSE >300 OR SICK, Disp: , Rfl:     Lancet Devices (ONE TOUCH DELICA LANCING DEV) MISC, USE FOR BLOOD SUGAR CHECKS UP TO 8 TIMES PER DAY., Disp: , Rfl: 1    LANTUS SOLOSTAR 100 units/mL injection pen, , Disp: , Rfl:     ONETOUCH DELICA LANCETS 33G MISC, , Disp: , Rfl:     ONETOUCH VERIO test  strip, , Disp: , Rfl:     tobramycin-dexamethasone (TOBRADEX) ophthalmic suspension, Administer 1 drop into the left eye every 4 (four) hours while awake (Patient not taking: Reported on 8/14/2024), Disp: 5 mL, Rfl: 0    Zinc Gluconate ([facility] ZINC PO), Take by mouth daily (Patient not taking: Reported on 8/14/2024), Disp: , Rfl:     Current Allergies     Allergies as of 08/14/2024 - Reviewed 08/14/2024   Allergen Reaction Noted    Amoxicillin Other (See Comments) 03/20/2015    Augmentin [amoxicillin-pot clavulanate] Other (See Comments) 03/20/2015    Penicillins Other (See Comments) 09/11/2017            The following portions of the patient's history were reviewed and updated as appropriate: allergies, current medications, past family history, past medical history, past social history, past surgical history and problem list.     Past Medical History:   Diagnosis Date    Depression     Diabetes mellitus (HCC)     type 1       Past Surgical History:   Procedure Laterality Date    CIRCUMCISION      DENTAL SURGERY         Family History   Problem Relation Age of Onset    No Known Problems Mother     No Known Problems Father     Mental illness Neg Hx     Substance Abuse Neg Hx          Medications have been verified.        Objective   /86   Pulse 88   Temp 97.5 °F (36.4 °C)   Resp 12   Wt 78.5 kg (173 lb)   SpO2 99%   BMI 26.30 kg/m²        Physical Exam     Physical Exam  Vitals and nursing note reviewed.   Constitutional:       Appearance: Normal appearance. He is normal weight.   HENT:      Head: Normocephalic and atraumatic.   Cardiovascular:      Rate and Rhythm: Normal rate.      Pulses: Normal pulses.   Musculoskeletal:         General: Normal range of motion.      Cervical back: Normal range of motion and neck supple.   Skin:     General: Skin is warm and dry.   Neurological:      General: No focal deficit present.      Mental Status: He is alert and oriented to person, place, and time.   Psychiatric:          Attention and Perception: Attention and perception normal.         Mood and Affect: Mood is depressed. Affect is flat and tearful.         Speech: Speech normal.         Behavior: Behavior normal. Behavior is cooperative.         Thought Content: Thought content normal.         Cognition and Memory: Cognition and memory normal.         Judgment: Judgment normal.                 This encounter was created for [facility] orders only .

## 2024-08-14 NOTE — LETTER
August 14, 2024     Patient: Panda Duong   YOB: 2000   Date of Visit: 8/14/2024       To Whom it May Concern:    Panda Duong was seen in my clinic on 8/14/2024. He may return to work on 8/16/2024 .    If you have any questions or concerns, please don't hesitate to call.         Sincerely,          BETY Singh        CC: No Recipients

## 2024-08-14 NOTE — PATIENT INSTRUCTIONS
Check in with local walk-in center within next 24-48 hours. Follow-up with PCP regarding medication refills. Report to the ER sooner if symptoms worsen.

## 2025-02-20 ENCOUNTER — TELEPHONE (OUTPATIENT)
Age: 25
End: 2025-02-20

## 2025-02-20 DIAGNOSIS — E10.9 TYPE 1 DIABETES MELLITUS WITHOUT COMPLICATION (HCC): Primary | ICD-10-CM

## 2025-02-20 NOTE — TELEPHONE ENCOUNTER
Pt's mom Allison requested to send a provider referral to:       Date of service: TBD    [facility]   [address] [place] [address],   [facility], [address]# [phone]  Fax#  not given     Please contact Allison # [phone] when this is complete. Thank you for your help.

## 2025-04-30 NOTE — TELEPHONE ENCOUNTER
PT's mother called to request an endocrinology referral. She was unsure if an ambulatory or insurance referral that is needed.I informed her there is an ambulatory referral already placed since Feb of this year that should still be good. I informed her to schedule an appt, and call back with the date of service, and we will definitely put in the request for an insurance referral.

## 2025-05-01 NOTE — TELEPHONE ENCOUNTER
Requested medication(s) are due for refill today: Yes  Patient has already received a courtesy refill: No  Other reason request has been forwarded to provider:    HBA1C within 180 days    Valid encounter within last 6 months Pt has appt scheduled 5/19

## 2025-05-12 PROBLEM — R35.89 FREQUENCY OF URINATION AND POLYURIA: Status: RESOLVED | Noted: 2017-11-27 | Resolved: 2025-05-12

## 2025-05-12 PROBLEM — R63.1 EXCESSIVE THIRST: Status: RESOLVED | Noted: 2017-11-27 | Resolved: 2025-05-12

## 2025-05-12 PROBLEM — Z78.9: Status: RESOLVED | Noted: 2021-11-26 | Resolved: 2025-05-12

## 2025-05-12 PROBLEM — R35.0 FREQUENCY OF URINATION AND POLYURIA: Status: RESOLVED | Noted: 2017-11-27 | Resolved: 2025-05-12

## 2025-06-12 RX ORDER — INSULIN GLARGINE 100 [IU]/ML
INJECTION, SOLUTION SUBCUTANEOUS
OUTPATIENT
Start: 2025-06-12

## 2025-06-12 RX ORDER — INSULIN LISPRO 100 [IU]/ML
INJECTION, SOLUTION INTRAVENOUS; SUBCUTANEOUS
Qty: 15 ML | OUTPATIENT
Start: 2025-06-12

## 2025-07-25 ENCOUNTER — OFFICE VISIT (OUTPATIENT)
Dept: FAMILY MEDICINE CLINIC | Facility: CLINIC | Age: 25
End: 2025-07-25
Payer: COMMERCIAL

## 2025-07-25 VITALS
WEIGHT: 183.2 LBS | HEIGHT: 70 IN | SYSTOLIC BLOOD PRESSURE: 120 MMHG | RESPIRATION RATE: 16 BRPM | DIASTOLIC BLOOD PRESSURE: 60 MMHG | OXYGEN SATURATION: 95 % | TEMPERATURE: 98.3 F | HEART RATE: 104 BPM | BODY MASS INDEX: 26.23 KG/M2

## 2025-07-25 DIAGNOSIS — E10.9 TYPE 1 DIABETES MELLITUS WITHOUT COMPLICATION (HCC): Primary | ICD-10-CM

## 2025-07-25 PROCEDURE — 99213 OFFICE O/P EST LOW 20 MIN: CPT | Performed by: STUDENT IN AN ORGANIZED HEALTH CARE EDUCATION/TRAINING PROGRAM

## 2025-07-25 RX ORDER — ACYCLOVIR 400 MG/1
TABLET ORAL
COMMUNITY
Start: 2025-07-09

## 2025-07-28 ENCOUNTER — TELEPHONE (OUTPATIENT)
Dept: ADMINISTRATIVE | Facility: OTHER | Age: 25
End: 2025-07-28

## 2025-07-30 ENCOUNTER — TELEPHONE (OUTPATIENT)
Age: 25
End: 2025-07-30

## 2025-07-31 LAB
BASOPHILS # BLD AUTO: 0.1 X10E3/UL (ref 0–0.2)
BASOPHILS NFR BLD AUTO: 1 %
EOSINOPHIL # BLD AUTO: 0.1 X10E3/UL (ref 0–0.4)
EOSINOPHIL NFR BLD AUTO: 2 %
ERYTHROCYTE [DISTWIDTH] IN BLOOD BY AUTOMATED COUNT: 12 % (ref 11.6–15.4)
EST. AVERAGE GLUCOSE BLD GHB EST-MCNC: 146 MG/DL
HBA1C MFR BLD: 6.7 % (ref 4.8–5.6)
HCT VFR BLD AUTO: 44.4 % (ref 37.5–51)
HGB BLD-MCNC: 15.1 G/DL (ref 13–17.7)
IMM GRANULOCYTES # BLD: 0 X10E3/UL (ref 0–0.1)
IMM GRANULOCYTES NFR BLD: 0 %
LYMPHOCYTES # BLD AUTO: 2.6 X10E3/UL (ref 0.7–3.1)
LYMPHOCYTES NFR BLD AUTO: 32 %
MCH RBC QN AUTO: 31.1 PG (ref 26.6–33)
MCHC RBC AUTO-ENTMCNC: 34 G/DL (ref 31.5–35.7)
MCV RBC AUTO: 91 FL (ref 79–97)
MONOCYTES # BLD AUTO: 0.7 X10E3/UL (ref 0.1–0.9)
MONOCYTES NFR BLD AUTO: 9 %
NEUTROPHILS # BLD AUTO: 4.6 X10E3/UL (ref 1.4–7)
NEUTROPHILS NFR BLD AUTO: 56 %
PLATELET # BLD AUTO: 321 X10E3/UL (ref 150–450)
RBC # BLD AUTO: 4.86 X10E6/UL (ref 4.14–5.8)
WBC # BLD AUTO: 8.1 X10E3/UL (ref 3.4–10.8)

## 2025-08-01 LAB
ALBUMIN SERPL-MCNC: 4.9 G/DL (ref 4.3–5.2)
ALBUMIN/CREAT UR: <3 MG/G CREAT (ref 0–29)
ALP SERPL-CCNC: 90 IU/L (ref 44–121)
ALT SERPL-CCNC: 22 IU/L (ref 0–44)
AST SERPL-CCNC: 21 IU/L (ref 0–40)
BILIRUB SERPL-MCNC: 0.3 MG/DL (ref 0–1.2)
BUN SERPL-MCNC: 12 MG/DL (ref 6–20)
BUN/CREAT SERPL: 15 (ref 9–20)
CALCIUM SERPL-MCNC: 9.5 MG/DL (ref 8.7–10.2)
CHLORIDE SERPL-SCNC: 98 MMOL/L (ref 96–106)
CO2 SERPL-SCNC: 22 MMOL/L (ref 20–29)
CREAT SERPL-MCNC: 0.78 MG/DL (ref 0.76–1.27)
CREAT UR-MCNC: 90.1 MG/DL
EGFR: 129 ML/MIN/1.73
GLOBULIN SER-MCNC: 2.2 G/DL (ref 1.5–4.5)
GLUCOSE SERPL-MCNC: 159 MG/DL (ref 70–99)
MICROALBUMIN UR-MCNC: <3 UG/ML
POTASSIUM SERPL-SCNC: 4.2 MMOL/L (ref 3.5–5.2)
PROT SERPL-MCNC: 7.1 G/DL (ref 6–8.5)
SODIUM SERPL-SCNC: 136 MMOL/L (ref 134–144)

## 2025-08-07 ENCOUNTER — CONSULT (OUTPATIENT)
Dept: ENDOCRINOLOGY | Facility: CLINIC | Age: 25
End: 2025-08-07
Attending: FAMILY MEDICINE
Payer: COMMERCIAL

## 2025-08-07 VITALS
HEIGHT: 70 IN | BODY MASS INDEX: 26.77 KG/M2 | OXYGEN SATURATION: 98 % | DIASTOLIC BLOOD PRESSURE: 82 MMHG | WEIGHT: 187 LBS | SYSTOLIC BLOOD PRESSURE: 122 MMHG | HEART RATE: 108 BPM

## 2025-08-07 DIAGNOSIS — E10.649 TYPE 1 DIABETES MELLITUS WITH HYPOGLYCEMIA AND WITHOUT COMA (HCC): Primary | ICD-10-CM

## 2025-08-07 DIAGNOSIS — E10.9 TYPE 1 DIABETES MELLITUS ON INSULIN THERAPY (HCC): ICD-10-CM

## 2025-08-07 PROCEDURE — 99204 OFFICE O/P NEW MOD 45 MIN: CPT | Performed by: INTERNAL MEDICINE

## 2025-08-07 RX ORDER — INSULIN GLARGINE 100 [IU]/ML
22 INJECTION, SOLUTION SUBCUTANEOUS DAILY
Qty: 19.8 ML | Refills: 0 | Status: SHIPPED | OUTPATIENT
Start: 2025-08-07

## 2025-08-07 RX ORDER — INSULIN LISPRO 100 [IU]/ML
INJECTION, SOLUTION INTRAVENOUS; SUBCUTANEOUS
Qty: 15 ML | Refills: 1 | Status: SHIPPED | OUTPATIENT
Start: 2025-08-07

## 2025-08-07 RX ORDER — BLOOD SUGAR DIAGNOSTIC
STRIP MISCELLANEOUS
Qty: 100 EACH | Refills: 2 | Status: SHIPPED | OUTPATIENT
Start: 2025-08-07

## 2025-08-07 RX ORDER — LANCETS
EACH MISCELLANEOUS
Qty: 100 EACH | Refills: 1 | Status: SHIPPED | OUTPATIENT
Start: 2025-08-07

## 2025-08-07 RX ORDER — INSULIN GLARGINE 100 [IU]/ML
INJECTION, SOLUTION SUBCUTANEOUS
Status: CANCELLED | OUTPATIENT
Start: 2025-08-07

## 2025-08-19 ENCOUNTER — TELEPHONE (OUTPATIENT)
Age: 25
End: 2025-08-19

## 2025-08-20 DIAGNOSIS — E10.649 TYPE 1 DIABETES MELLITUS WITH HYPOGLYCEMIA AND WITHOUT COMA (HCC): Primary | ICD-10-CM

## 2025-08-20 RX ORDER — BLOOD-GLUCOSE METER
EACH MISCELLANEOUS 3 TIMES DAILY
Qty: 1 KIT | Refills: 0 | Status: SHIPPED | OUTPATIENT
Start: 2025-08-20

## 2025-08-20 RX ORDER — LANCETS
EACH MISCELLANEOUS
Qty: 200 EACH | Refills: 3 | Status: SHIPPED | OUTPATIENT
Start: 2025-08-20

## 2025-08-20 RX ORDER — BLOOD SUGAR DIAGNOSTIC
STRIP MISCELLANEOUS
Qty: 200 STRIP | Refills: 3 | Status: SHIPPED | OUTPATIENT
Start: 2025-08-20